# Patient Record
Sex: MALE
[De-identification: names, ages, dates, MRNs, and addresses within clinical notes are randomized per-mention and may not be internally consistent; named-entity substitution may affect disease eponyms.]

---

## 2018-06-04 PROBLEM — Z00.00 ENCOUNTER FOR PREVENTIVE HEALTH EXAMINATION: Status: ACTIVE | Noted: 2018-06-04

## 2018-06-12 ENCOUNTER — APPOINTMENT (OUTPATIENT)
Dept: OPHTHALMOLOGY | Facility: CLINIC | Age: 28
End: 2018-06-12

## 2019-06-19 ENCOUNTER — APPOINTMENT (OUTPATIENT)
Dept: ORTHOPEDIC SURGERY | Facility: CLINIC | Age: 29
End: 2019-06-19

## 2021-07-02 ENCOUNTER — APPOINTMENT (OUTPATIENT)
Dept: PULMONOLOGY | Facility: CLINIC | Age: 31
End: 2021-07-02
Payer: MEDICAID

## 2021-07-02 VITALS
SYSTOLIC BLOOD PRESSURE: 160 MMHG | HEART RATE: 92 BPM | DIASTOLIC BLOOD PRESSURE: 88 MMHG | BODY MASS INDEX: 25.48 KG/M2 | TEMPERATURE: 98 F | HEIGHT: 69 IN | WEIGHT: 172 LBS | RESPIRATION RATE: 16 BRPM | OXYGEN SATURATION: 97 %

## 2021-07-02 DIAGNOSIS — Z87.09 PERSONAL HISTORY OF OTHER DISEASES OF THE RESPIRATORY SYSTEM: ICD-10-CM

## 2021-07-02 PROCEDURE — 94060 EVALUATION OF WHEEZING: CPT

## 2021-07-02 PROCEDURE — 94729 DIFFUSING CAPACITY: CPT

## 2021-07-02 PROCEDURE — 99204 OFFICE O/P NEW MOD 45 MIN: CPT | Mod: 25

## 2021-07-02 PROCEDURE — 94727 GAS DIL/WSHOT DETER LNG VOL: CPT

## 2021-07-02 RX ORDER — ALBUTEROL SULFATE 90 UG/1
108 (90 BASE) INHALANT RESPIRATORY (INHALATION)
Refills: 0 | Status: ACTIVE | COMMUNITY

## 2021-07-03 NOTE — REVIEW OF SYSTEMS
[Chest Tightness] : chest tightness [Fever] : no fever [Nasal Congestion] : no nasal congestion [Cough] : no cough [Wheezing] : no wheezing [Dyspnea] : no dyspnea [Chest Discomfort] : no chest discomfort [Nasal Discharge] : no nasal discharge [GERD] : no gerd [Myalgias] : no myalgias [Rash] : no rash [Anemia] : no anemia [Headache] : no headache [Anxiety] : no anxiety [Diabetes] : no diabetes

## 2021-07-03 NOTE — PROCEDURE
[FreeTextEntry1] : PFT 7/2/21: Mild obstruction. Mild restriction. Diffusion capacity was normal. Mild improvement after bronchodilator.

## 2021-07-03 NOTE — HISTORY OF PRESENT ILLNESS
[Never] : never [TextBox_4] : He is a 31 year old man who has had asthma since he was a child. The asthma was very active when he was younger. He presented with chest tightness for several months. He is physically active. Plays basketball and exercises regularly. \par \par At present no cough or wheezing. No fever, chills or sweats. \par \par Uses albuterol on occasion. Not on any other inhaler. \par \par Recieved the COVID 19 vaccine. \par \par

## 2021-07-03 NOTE — PHYSICAL EXAM
[Normal Oropharynx] : normal oropharynx [No Acute Distress] : no acute distress [No Neck Mass] : no neck mass [Normal S1, S2] : normal s1, s2 [No Resp Distress] : no resp distress [No HSM] : no hsm [Clear to Auscultation Bilaterally] : clear to auscultation bilaterally [Normal Gait] : normal gait [No Clubbing] : no clubbing [No Edema] : no edema [Normal Turgor] : normal turgor [No Focal Deficits] : no focal deficits [Oriented x3] : oriented x3 [Normal Affect] : normal affect

## 2021-07-03 NOTE — DISCUSSION/SUMMARY
[FreeTextEntry1] : He is a 31 year old man who has had asthma since he was a child. The asthma was very active when he was younger. He presented with chest tightness for several months. He is physically active. Plays basketball and exercises regularly. \par \par At present he is asymptomatic. Uses albuterol on occasion. \par \par His asthma is not active now. The physical examination was unremarkable. PFT is almost normal. \par \par Advised to continue with albuterol as needed. To complete his Cardiology evaluation which is in progress. \par \par Will see again as needed.  \par \par \par

## 2022-05-05 ENCOUNTER — APPOINTMENT (OUTPATIENT)
Dept: PULMONOLOGY | Facility: CLINIC | Age: 32
End: 2022-05-05
Payer: MEDICAID

## 2022-05-05 VITALS
WEIGHT: 180 LBS | TEMPERATURE: 99.3 F | DIASTOLIC BLOOD PRESSURE: 80 MMHG | OXYGEN SATURATION: 97 % | HEART RATE: 101 BPM | SYSTOLIC BLOOD PRESSURE: 130 MMHG | BODY MASS INDEX: 26.58 KG/M2

## 2022-05-05 DIAGNOSIS — R07.89 OTHER CHEST PAIN: ICD-10-CM

## 2022-05-05 DIAGNOSIS — J45.909 UNSPECIFIED ASTHMA, UNCOMPLICATED: ICD-10-CM

## 2022-05-05 PROCEDURE — 94060 EVALUATION OF WHEEZING: CPT

## 2022-05-05 PROCEDURE — 99214 OFFICE O/P EST MOD 30 MIN: CPT | Mod: 25

## 2022-05-05 RX ORDER — LEVALBUTEROL TARTRATE 45 UG/1
45 AEROSOL, METERED ORAL
Qty: 1 | Refills: 5 | Status: ACTIVE | COMMUNITY
Start: 2022-05-05 | End: 1900-01-01

## 2022-05-05 RX ORDER — BUDESONIDE AND FORMOTEROL FUMARATE DIHYDRATE 160; 4.5 UG/1; UG/1
160-4.5 AEROSOL RESPIRATORY (INHALATION)
Qty: 1 | Refills: 5 | Status: ACTIVE | COMMUNITY
Start: 2022-05-05 | End: 1900-01-01

## 2022-05-05 NOTE — HISTORY OF PRESENT ILLNESS
[Never] : never [TextBox_4] : He is a 32 year old man who has had asthma since he was a child. The asthma was very active when he was younger. \par \par Has been using Symbicort once a day for two weeks now. Was not using it before. Does not use albuterol. It makes him tachycardic. Plays basketball. Does not smoke. \par \par \par \par  [Difficulty Initiating Sleep] : does not have difficulty initiating sleep [Difficulty Maintaining Sleep] : does not have difficulty maintaining sleep

## 2022-05-05 NOTE — PROCEDURE
[FreeTextEntry1] : PFT 7/2/21: Mild obstruction. Mild restriction. Diffusion capacity was normal. Mild improvement after bronchodilator. \par \par PFT 5/5/22: Minimal obstruction. No significant improvement after bronchodilator.

## 2022-05-05 NOTE — REVIEW OF SYSTEMS
[Chest Tightness] : chest tightness [Fever] : no fever [Nasal Congestion] : no nasal congestion [Cough] : no cough [Dyspnea] : no dyspnea [Wheezing] : no wheezing [Chest Discomfort] : no chest discomfort [Nasal Discharge] : no nasal discharge [GERD] : no gerd [Myalgias] : no myalgias [Rash] : no rash [Anemia] : no anemia [Headache] : no headache [Anxiety] : no anxiety [Diabetes] : no diabetes

## 2022-05-05 NOTE — DISCUSSION/SUMMARY
[FreeTextEntry1] : He is a 32 year old man who has had asthma since he was a child. The asthma was very active when he was younger. \par \par Has been using Symbicort once a day for two weeks now. Was not using it before. Does not use albuterol. It makes him tachycardic. \par \par PFT 5/5/22: Minimal obstruction. No significant improvement after bronchodilator. \par \par Advised to use Symbicort on a regular basis. Try Xopenex in place of albuterol. \par \par Follow up in 6 to 12 months or sooner if needed. \par

## 2022-05-05 NOTE — PHYSICAL EXAM
[No Acute Distress] : no acute distress [Normal Oropharynx] : normal oropharynx [No Neck Mass] : no neck mass [Normal S1, S2] : normal s1, s2 [No Resp Distress] : no resp distress [Clear to Auscultation Bilaterally] : clear to auscultation bilaterally [No HSM] : no hsm [Normal Gait] : normal gait [No Clubbing] : no clubbing [No Edema] : no edema [Normal Turgor] : normal turgor [No Focal Deficits] : no focal deficits [Oriented x3] : oriented x3 [Normal Affect] : normal affect

## 2025-01-28 ENCOUNTER — APPOINTMENT (OUTPATIENT)
Dept: PLASTIC SURGERY | Facility: CLINIC | Age: 35
End: 2025-01-28
Payer: COMMERCIAL

## 2025-01-28 DIAGNOSIS — R22.31 LOCALIZED SWELLING, MASS AND LUMP, RIGHT UPPER LIMB: ICD-10-CM

## 2025-01-28 PROCEDURE — 99203 OFFICE O/P NEW LOW 30 MIN: CPT | Mod: 25

## 2025-01-28 PROCEDURE — 20612 ASPIRATE/INJ GANGLION CYST: CPT

## 2025-01-30 ENCOUNTER — TRANSCRIPTION ENCOUNTER (OUTPATIENT)
Age: 35
End: 2025-01-30

## 2025-02-05 ENCOUNTER — OUTPATIENT (OUTPATIENT)
Dept: OUTPATIENT SERVICES | Facility: HOSPITAL | Age: 35
LOS: 1 days | End: 2025-02-05
Payer: COMMERCIAL

## 2025-02-05 VITALS
DIASTOLIC BLOOD PRESSURE: 86 MMHG | RESPIRATION RATE: 12 BRPM | HEART RATE: 88 BPM | TEMPERATURE: 99 F | HEIGHT: 68.5 IN | WEIGHT: 179.9 LBS | SYSTOLIC BLOOD PRESSURE: 147 MMHG | OXYGEN SATURATION: 99 %

## 2025-02-05 DIAGNOSIS — Z98.890 OTHER SPECIFIED POSTPROCEDURAL STATES: Chronic | ICD-10-CM

## 2025-02-05 DIAGNOSIS — R22.31 LOCALIZED SWELLING, MASS AND LUMP, RIGHT UPPER LIMB: ICD-10-CM

## 2025-02-05 DIAGNOSIS — Z01.818 ENCOUNTER FOR OTHER PREPROCEDURAL EXAMINATION: ICD-10-CM

## 2025-02-05 LAB
ANION GAP SERPL CALC-SCNC: 14 MMOL/L — SIGNIFICANT CHANGE UP (ref 5–17)
BUN SERPL-MCNC: 24 MG/DL — HIGH (ref 7–23)
CALCIUM SERPL-MCNC: 9.8 MG/DL — SIGNIFICANT CHANGE UP (ref 8.4–10.5)
CHLORIDE SERPL-SCNC: 101 MMOL/L — SIGNIFICANT CHANGE UP (ref 96–108)
CO2 SERPL-SCNC: 26 MMOL/L — SIGNIFICANT CHANGE UP (ref 22–31)
CREAT SERPL-MCNC: 0.95 MG/DL — SIGNIFICANT CHANGE UP (ref 0.5–1.3)
EGFR: 107 ML/MIN/1.73M2 — SIGNIFICANT CHANGE UP
GLUCOSE SERPL-MCNC: 87 MG/DL — SIGNIFICANT CHANGE UP (ref 70–99)
POTASSIUM SERPL-MCNC: 4.1 MMOL/L — SIGNIFICANT CHANGE UP (ref 3.5–5.3)
POTASSIUM SERPL-SCNC: 4.1 MMOL/L — SIGNIFICANT CHANGE UP (ref 3.5–5.3)
SODIUM SERPL-SCNC: 141 MMOL/L — SIGNIFICANT CHANGE UP (ref 135–145)

## 2025-02-05 PROCEDURE — G0463: CPT

## 2025-02-05 PROCEDURE — 80048 BASIC METABOLIC PNL TOTAL CA: CPT

## 2025-02-05 RX ORDER — SODIUM CHLORIDE 9 G/ML
1000 INJECTION, SOLUTION INTRAVENOUS
Refills: 0 | Status: DISCONTINUED | OUTPATIENT
Start: 2025-02-10 | End: 2025-02-24

## 2025-02-05 RX ORDER — BACTERIOSTATIC SODIUM CHLORIDE 0.9 %
3 VIAL (ML) INJECTION EVERY 8 HOURS
Refills: 0 | Status: DISCONTINUED | OUTPATIENT
Start: 2025-02-10 | End: 2025-02-24

## 2025-02-05 NOTE — H&P PST ADULT - ATTENDING COMMENTS
34-year-old male with right thumb mass. No changes in health.    Proceed with excision under local anesthesia.  Discharge home after.

## 2025-02-05 NOTE — H&P PST ADULT - HEART RATE (BEATS/MIN)
Chart reviewed, order received for MBS.  Pt received in Radiology in bed sleeping, +O2NC SpO2 98%, pt arousable with cues, however, pt very lethargic, difficulty maintaining arousal despite consistent max modality cues.  Pt currently not appropriate for MBS at this time.  Discussed current presentation with CAM Lopez and Dr. Tomás MD in agreement to cx MBS for today, pt transported back to unit by Radiology transport NAD.  Will follow to reassess swallow at bedside and to determine candidacy for solid advancement, as well as, candidacy to reattempt MBS.  Dr. England aware.
88

## 2025-02-05 NOTE — H&P PST ADULT - HISTORY OF PRESENT ILLNESS
34 year old male with PMH of Asthma (states occasional Symbicort inhaler use, less than once a month, last used > 1 month ago), [place] HTN, Lymph node resection in neck for enlargement. Patient states that he has nodules in both of his hands. He has one on the thenar eminence of the right hand and one at the base of the left small finger. He describes noticing a small bump on right hand a couple months ago, which has subsequently doubled in size over the period of a month. Simultaneously, patient noted another joint nub on left hand which is more frequently painful especially when pressure is applied. He underwent cortisone injection to left hand nodule. He presents today to PST prior to scheduled Excisional Biopsy of Right Hand Thenar [place] on 2/10/25. Patient denies recent fever, chills, chest pain, SOB, palpitations, or recent exposure to COVID-19.

## 2025-02-05 NOTE — H&P PST ADULT - PROBLEM SELECTOR PLAN 1
Excisional Biopsy of Right Hand Thenar [place] on 2/10/25.  BMP done today at [facility].   Pre op instructions, including chlorhexidine, provided and all questions answered.

## 2025-02-05 NOTE — H&P PST ADULT - HEIGHT IN FEET
Provider Procedure Text (C): After obtaining clear surgical margins the defect was repaired by another provider. 5

## 2025-02-05 NOTE — H&P PST ADULT - ASSESSMENT
Activity: Patient states he exercises multiple times a week with playing basketball and weight lifting, and denies cardiac symptoms.     DASI: 9.89    Mallampati: 2    Dental: Denies loose teeth or dentures.

## 2025-02-05 NOTE — H&P PST ADULT - COMMENTS
Patient states his BP elevates due to known "white coat HTN", but checks at home often and gets 110-120/70's.

## 2025-02-10 ENCOUNTER — TRANSCRIPTION ENCOUNTER (OUTPATIENT)
Age: 35
End: 2025-02-10

## 2025-02-10 ENCOUNTER — APPOINTMENT (OUTPATIENT)
Dept: PLASTIC SURGERY | Facility: HOSPITAL | Age: 35
End: 2025-02-10

## 2025-02-10 ENCOUNTER — OUTPATIENT (OUTPATIENT)
Dept: OUTPATIENT SERVICES | Facility: HOSPITAL | Age: 35
LOS: 1 days | End: 2025-02-10
Payer: COMMERCIAL

## 2025-02-10 VITALS
RESPIRATION RATE: 15 BRPM | HEART RATE: 95 BPM | DIASTOLIC BLOOD PRESSURE: 75 MMHG | OXYGEN SATURATION: 97 % | SYSTOLIC BLOOD PRESSURE: 113 MMHG

## 2025-02-10 VITALS
OXYGEN SATURATION: 100 % | HEIGHT: 68.5 IN | TEMPERATURE: 97 F | HEART RATE: 71 BPM | DIASTOLIC BLOOD PRESSURE: 91 MMHG | SYSTOLIC BLOOD PRESSURE: 144 MMHG | WEIGHT: 179.9 LBS | RESPIRATION RATE: 16 BRPM

## 2025-02-10 DIAGNOSIS — R22.31 LOCALIZED SWELLING, MASS AND LUMP, RIGHT UPPER LIMB: ICD-10-CM

## 2025-02-10 DIAGNOSIS — Z98.890 OTHER SPECIFIED POSTPROCEDURAL STATES: Chronic | ICD-10-CM

## 2025-02-10 PROBLEM — J45.909 UNSPECIFIED ASTHMA, UNCOMPLICATED: Chronic | Status: ACTIVE | Noted: 2025-02-05

## 2025-02-10 PROCEDURE — 88341 IMHCHEM/IMCYTCHM EA ADD ANTB: CPT

## 2025-02-10 PROCEDURE — 88307 TISSUE EXAM BY PATHOLOGIST: CPT

## 2025-02-10 PROCEDURE — 88342 IMHCHEM/IMCYTCHM 1ST ANTB: CPT

## 2025-02-10 PROCEDURE — 88341 IMHCHEM/IMCYTCHM EA ADD ANTB: CPT | Mod: 26,59

## 2025-02-10 PROCEDURE — 88360 TUMOR IMMUNOHISTOCHEM/MANUAL: CPT | Mod: 26

## 2025-02-10 PROCEDURE — 26116 EXC HAND TUM DEEP < 1.5 CM: CPT | Mod: RT

## 2025-02-10 PROCEDURE — 88342 IMHCHEM/IMCYTCHM 1ST ANTB: CPT | Mod: 26,59

## 2025-02-10 PROCEDURE — 88307 TISSUE EXAM BY PATHOLOGIST: CPT | Mod: 26

## 2025-02-10 PROCEDURE — 11421 EXC H-F-NK-SP B9+MARG 0.6-1: CPT

## 2025-02-10 PROCEDURE — 88360 TUMOR IMMUNOHISTOCHEM/MANUAL: CPT

## 2025-02-10 RX ORDER — BUDESONIDE AND FORMOTEROL FUMARATE DIHYDRATE 80; 4.5 UG/1; UG/1
2 AEROSOL RESPIRATORY (INHALATION)
Refills: 0 | DISCHARGE

## 2025-02-10 RX ORDER — ANTISEPTIC SURGICAL SCRUB 0.04 MG/ML
1 SOLUTION TOPICAL ONCE
Refills: 0 | Status: COMPLETED | OUTPATIENT
Start: 2025-02-10 | End: 2025-02-10

## 2025-02-10 RX ORDER — LIDOCAINE HYDROCHLORIDE 10 MG/ML
0.2 INJECTION EPIDURAL; INFILTRATION; INTRACAUDAL ONCE
Refills: 0 | Status: COMPLETED | OUTPATIENT
Start: 2025-02-10 | End: 2025-02-10

## 2025-02-10 RX ADMIN — Medication 3 MILLILITER(S): at 09:30

## 2025-02-10 RX ADMIN — ANTISEPTIC SURGICAL SCRUB 1 APPLICATION(S): 0.04 SOLUTION TOPICAL at 09:29

## 2025-02-10 RX ADMIN — SODIUM CHLORIDE 100 MILLILITER(S): 9 INJECTION, SOLUTION INTRAVENOUS at 09:29

## 2025-02-10 NOTE — ASU PREOP CHECKLIST - PATIENT'S PERSONAL PROPERTY REMOVED
temporary left upper crown secure temporary left upper crown secure, wedding ring removed and given to wife by pt/[email or web address]

## 2025-02-10 NOTE — ASU DISCHARGE PLAN (ADULT/PEDIATRIC) - ASU DC SPECIAL INSTRUCTIONSFT
Per attached sheet    Additionally, keep dressings in place for 2 days. No heavy lifting, but light range of motion encouraged.  For pain, take over the counter medication.    WOUND CARE:  Please keep incisions clean and dry. Please do not Scrub or rub incisions. Do not use lotion or powder on incisions.   BATHING: Please do not submerge wound underwater. You may shower and/or sponge bathe.  ACTIVITY: No heavy lifting or straining. Otherwise, you may return to your usual level of physical activity. If you are taking narcotic pain medication (such as Percocet) DO NOT drive a car, operate machinery or make important decisions.  DIET: Return to your usual diet.  NOTIFY YOUR SURGEON IF: You have any bleeding that does not stop, any pus draining from your wound(s), any fever (over 100.4 F) or chills, persistent nausea/vomiting, persistent diarrhea, or if your pain is not controlled on your discharge pain medications.  FOLLOW-UP: Please follow up with your primary care physician in one week regarding your

## 2025-02-10 NOTE — ASU DISCHARGE PLAN (ADULT/PEDIATRIC) - NS MD DC FALL RISK RISK
For information on Fall & Injury Prevention, visit: [email or web address] OR  [email or web address] OR  https://www.cdc.gov/steadi/patient.html

## 2025-02-10 NOTE — ASU PREOP CHECKLIST - HAND OFF
Problem: Fall Injury Risk  Goal: Absence of Fall and Fall-Related Injury  Outcome: Ongoing, Progressing  Intervention: Identify and Manage Contributors  Flowsheets (Taken 12/4/2023 1126)  Self-Care Promotion: independence encouraged  Medication Review/Management: medications reviewed  Intervention: Promote Injury-Free Environment  Flowsheets (Taken 12/4/2023 1126)  Safety Promotion/Fall Prevention: medications reviewed     
Holding RN to OR RN

## 2025-02-10 NOTE — ASU DISCHARGE PLAN (ADULT/PEDIATRIC) - CARE PROVIDER_API CALL
Renny Rosas  [facility]  [address] [place] [address]  Phone: [phone]  Fax: [phone]  Follow Up Time: 2 weeks

## 2025-02-20 ENCOUNTER — APPOINTMENT (OUTPATIENT)
Dept: PULMONOLOGY | Facility: CLINIC | Age: 35
End: 2025-02-20

## 2025-02-27 ENCOUNTER — APPOINTMENT (OUTPATIENT)
Dept: PLASTIC SURGERY | Facility: CLINIC | Age: 35
End: 2025-02-27

## 2025-02-27 DIAGNOSIS — R22.31 LOCALIZED SWELLING, MASS AND LUMP, RIGHT UPPER LIMB: ICD-10-CM

## 2025-02-27 DIAGNOSIS — L98.0 PYOGENIC GRANULOMA: ICD-10-CM

## 2025-02-27 LAB — SURGICAL PATHOLOGY STUDY: SIGNIFICANT CHANGE UP

## 2025-02-27 PROCEDURE — 99024 POSTOP FOLLOW-UP VISIT: CPT

## 2025-02-28 PROBLEM — L98.0 PYOGENIC GRANULOMA OF SKIN: Status: ACTIVE | Noted: 2025-02-28

## 2025-02-28 PROBLEM — R22.31 SUBCUTANEOUS MASS OF RIGHT HAND: Status: RESOLVED | Noted: 2025-01-28 | Resolved: 2025-02-28

## 2025-05-28 ENCOUNTER — APPOINTMENT (OUTPATIENT)
Dept: CARDIOLOGY | Facility: CLINIC | Age: 35
End: 2025-05-28

## (undated) DEVICE — DRSG WEBRIL 4"

## (undated) DEVICE — SOL IRR POUR NS 0.9% 500ML

## (undated) DEVICE — DRSG COBAN 2" LF STERILE

## (undated) DEVICE — DRSG DERMABOND 0.7ML

## (undated) DEVICE — SUT VICRYL RAPIDE 5-0 28" P-3 UNDYED

## (undated) DEVICE — TOURNIQUET CUFF 24" DUAL PORT SINGLE BLADDER LUER LOCK  (BLACK)

## (undated) DEVICE — TOURNIQUET CUFF 18" DUAL PORT SINGLE BLADDER LUER LOCK (BLACK)

## (undated) DEVICE — PACK HAND

## (undated) DEVICE — DRSG STERISTRIPS 0.5 X 4"

## (undated) DEVICE — WARMING BLANKET LOWER ADULT

## (undated) DEVICE — PREP CHLORAPREP HI-LITE ORANGE 26ML

## (undated) DEVICE — MEDICATION LABELS W MARKER

## (undated) DEVICE — SUT MONOCRYL 5-0 18" P-3 UNDYED

## (undated) DEVICE — DRAPE TOWEL BLUE 17" X 24"

## (undated) DEVICE — GLV 7 PROTEXIS (WHITE)